# Patient Record
Sex: MALE | Race: WHITE
[De-identification: names, ages, dates, MRNs, and addresses within clinical notes are randomized per-mention and may not be internally consistent; named-entity substitution may affect disease eponyms.]

---

## 2024-04-01 PROBLEM — Z00.00 ENCOUNTER FOR PREVENTIVE HEALTH EXAMINATION: Status: ACTIVE | Noted: 2024-04-01

## 2024-04-02 ENCOUNTER — APPOINTMENT (OUTPATIENT)
Dept: NEUROSURGERY | Facility: CLINIC | Age: 34
End: 2024-04-02
Payer: COMMERCIAL

## 2024-04-02 VITALS — HEART RATE: 70 BPM | HEIGHT: 75 IN | BODY MASS INDEX: 33.57 KG/M2 | WEIGHT: 270 LBS

## 2024-04-02 DIAGNOSIS — M54.50 LOW BACK PAIN, UNSPECIFIED: ICD-10-CM

## 2024-04-02 PROCEDURE — 99203 OFFICE O/P NEW LOW 30 MIN: CPT

## 2024-04-02 RX ORDER — METHYLPREDNISOLONE 4 MG/1
4 TABLET ORAL
Qty: 1 | Refills: 0 | Status: ACTIVE | COMMUNITY
Start: 2024-04-02 | End: 1900-01-01

## 2024-04-02 RX ORDER — CELECOXIB 200 MG/1
200 CAPSULE ORAL DAILY
Qty: 30 | Refills: 2 | Status: ACTIVE | COMMUNITY
Start: 2024-04-02 | End: 1900-01-01

## 2024-04-02 NOTE — HISTORY OF PRESENT ILLNESS
[de-identified] : 34-year-old male with 1 week of low back pain after bending over to pick something up at the train station.  He is a  as well as a .  He describes that he pain started suddenly and had some occasional left leg pain.  He has no pain rating down the legs and said some slight improvement he has no new numbness tingling weakness of bladder bowel function.  He denies any aggravating relieving factors the pain is severe and is causing him difficulty with walking and standing.  He is only tried over-the-counter anti-inflammatories has not started therapy his wife is a nurse recommended he have an evaluation prior to any treatment

## 2024-04-02 NOTE — REASON FOR VISIT
[New Patient Visit] : a new patient visit [FreeTextEntry1] : Pt is here with complaints of lower back pain radiating into his left thigh and calf.

## 2024-04-02 NOTE — ASSESSMENT
[FreeTextEntry1] : 34-year-old male with acute low back pain.  At this time start a course of Medrol Dosepak as well as Celebrex.  Like to start some conservative management which includes but not limited to physical therapy.  He can see me back in 4 weeks for reevaluation and further order diagnostic imaging is necessary he understands and agrees to plan will follow-up accordingly

## 2024-04-30 ENCOUNTER — APPOINTMENT (OUTPATIENT)
Dept: NEUROSURGERY | Facility: CLINIC | Age: 34
End: 2024-04-30